# Patient Record
Sex: FEMALE | Race: WHITE | NOT HISPANIC OR LATINO | URBAN - METROPOLITAN AREA
[De-identification: names, ages, dates, MRNs, and addresses within clinical notes are randomized per-mention and may not be internally consistent; named-entity substitution may affect disease eponyms.]

---

## 2021-04-15 DIAGNOSIS — Z23 ENCOUNTER FOR IMMUNIZATION: ICD-10-CM

## 2022-10-18 LAB
EXTERNAL HIV SCREEN: NORMAL
HBA1C MFR BLD HPLC: 5.1 %
HCV AB SER-ACNC: <0.1

## 2022-12-07 ENCOUNTER — ROUTINE PRENATAL (OUTPATIENT)
Dept: PERINATAL CARE | Facility: OTHER | Age: 27
End: 2022-12-07

## 2022-12-07 VITALS
DIASTOLIC BLOOD PRESSURE: 72 MMHG | HEART RATE: 100 BPM | HEIGHT: 63 IN | BODY MASS INDEX: 39.83 KG/M2 | SYSTOLIC BLOOD PRESSURE: 128 MMHG | WEIGHT: 224.8 LBS

## 2022-12-07 DIAGNOSIS — Z3A.20 20 WEEKS GESTATION OF PREGNANCY: ICD-10-CM

## 2022-12-07 DIAGNOSIS — O09.899 SUPERVISION OF OTHER HIGH RISK PREGNANCIES, UNSPECIFIED TRIMESTER: ICD-10-CM

## 2022-12-07 DIAGNOSIS — Z36.86 ENCOUNTER FOR ANTENATAL SCREENING FOR CERVICAL LENGTH: ICD-10-CM

## 2022-12-07 DIAGNOSIS — O99.210 OBESITY AFFECTING PREGNANCY, ANTEPARTUM: Primary | ICD-10-CM

## 2022-12-07 RX ORDER — PROPRANOLOL HYDROCHLORIDE 10 MG/1
TABLET ORAL
COMMUNITY

## 2022-12-07 NOTE — LETTER
2022     Chiki Cedeno, [address] [facility]  [address]    Patient: Shannon Mesa   YOB: 1995   Date of Visit: 2022       Dear Dr Dominga Villanueva: Thank you for referring Shannon Mesa to me for evaluation  Below are my notes for this consultation  If you have questions, please do not hesitate to call me  I look forward to following your patient along with you  Sincerely,        Claudia Maynard MD        CC: No Recipients  Claudia Maynard MD  2022  3:20 PM  Sign when Signing Visit  Nidhi Corrales presents today for a maternal-fetal medicine evaluation and fetal anatomic evaluation  This is the patient's second pregnancy  She has an extremely short interval pregnancy of approximately 3 months  She underwent  section on May 9 of this year at 41 weeks gestation after a 72-hour labor  Indication for  section was fetal indication  Her son weighed 7 pounds 14 ounces  She has an elevated BMI and a surgical history of a herniorrhaphy  She has an allergy to Keflex  Substance use history and family medical history are otherwise unremarkable  Review of systems is otherwise negative  On exam today the patient appears well, in no acute distress, and denies any complaints  Today's ultrasound is limited by fetal position; therefore, the fetal anatomic survey could not be completed  No significant fetal abnormalities are appreciated or suspected  Good fetal movement and tone are seen  The amniotic fluid volume appears normal   A transvaginal ultrasound was performed to assess the cervix, which was cannot be acurately measuraed transabdominally  The cervical length was 4 41 centimeters, which is normal for the current gestational age and above the threshold in which intervention is generally recommended  There was no significant funneling or dynamic changes appreciated  She was informed of today's findings and all of her questions were answered  Jose Luis Kebede had questions regarding opportunity for trial of labor after  section  She has an extremely short interpregnancy interval of approximately 3 months and has an extremely short interdelivery interval of under 1 year  We reviewed that an intra delivery interval of less than 18 months carries a relative risk of approximately 3 times for uterine rupture  We discussed that a short interval delivery interval is not an absolute contraindication to a trial of labor after  section but discussion of these increased risks is warranted  The patient has a short interpregnancy interval of less than 4 months which is associated with increased risk for adverse pregnancy outcomes including but not limited to  labor and fetal growth restriction  Recommend micronutrient supplementation utilizing prenatal vitamins and a follow-up 3rd trimester growth ultrasound  The implications of obesity and pregnancy are significant  The level of obesity is directly related to the risk of adverse pregnancy outcomes including but not limited to, risk of diabetes, hypertensive disorders of pregnancy, macrosomia, intrauterine growth restriction, labor and shoulder dystocias,  section, and increased risk of stillbirth  Recommend discussing the current weight gain recommendations for women with obesity and discussing good dietary practices as well as the safety of exercise in pregnancy  I recommend the patient gain no more than 11-20 pounds throughout her entire pregnancy, increase her exercise and follow healthy dietary habits  Consider referral to a dietitian should the patient have difficulty following the aforementioned recommendations  Recommend third trimester growth ultrasounds to screen for fetal growth problems as well as ensuring the patient is appropriately screened for pregestational and gestational diabetes    Additional  surveillance is recommended starting at 36 weeks in those women with a BMI of greater than 40 given the increased risk for stillbirth  We discussed follow-up in detail and I recommend she return in 8 weeks to our Center in order to complete the fetal anatomic survey and assess fetal growth in the setting of short interval pregnancy  Thank you very much for allowing us to participate in the care of this very nice patient  Should you have any questions, please do not hesitate to contact me

## 2022-12-07 NOTE — PROGRESS NOTES
Ultrasound Probe Disinfection    A transvaginal ultrasound was performed  Prior to use, disinfection was performed with High Level Disinfection Process (Trophon)  Probe serial number [id number]: [id number] was used        Amalia Rowan  12/07/22  1:32 PM

## 2022-12-07 NOTE — PROGRESS NOTES
Ainsley Saravia presents today for a maternal-fetal medicine evaluation and fetal anatomic evaluation  This is the patient's second pregnancy  She has an extremely short interval pregnancy of approximately 3 months  She underwent  section on May 9 of this year at 41 weeks gestation after a 72-hour labor  Indication for  section was fetal indication  Her son weighed 7 pounds 14 ounces  She has an elevated BMI and a surgical history of a herniorrhaphy  She has an allergy to Keflex  Substance use history and family medical history are otherwise unremarkable  Review of systems is otherwise negative  On exam today the patient appears well, in no acute distress, and denies any complaints  Today's ultrasound is limited by fetal position; therefore, the fetal anatomic survey could not be completed  No significant fetal abnormalities are appreciated or suspected  Good fetal movement and tone are seen  The amniotic fluid volume appears normal   A transvaginal ultrasound was performed to assess the cervix, which was cannot be acurately measuraed transabdominally  The cervical length was 4 41 centimeters, which is normal for the current gestational age and above the threshold in which intervention is generally recommended  There was no significant funneling or dynamic changes appreciated  She was informed of today's findings and all of her questions were answered  Ainsley Saravia had questions regarding opportunity for trial of labor after  section  She has an extremely short interpregnancy interval of approximately 3 months and has an extremely short interdelivery interval of under 1 year  We reviewed that an intra delivery interval of less than 18 months carries a relative risk of approximately 3 times for uterine rupture    We discussed that a short interval delivery interval is not an absolute contraindication to a trial of labor after  section but discussion of these increased risks is warranted  The patient has a short interpregnancy interval of less than 4 months which is associated with increased risk for adverse pregnancy outcomes including but not limited to  labor and fetal growth restriction  Recommend micronutrient supplementation utilizing prenatal vitamins and a follow-up 3rd trimester growth ultrasound  The implications of obesity and pregnancy are significant  The level of obesity is directly related to the risk of adverse pregnancy outcomes including but not limited to, risk of diabetes, hypertensive disorders of pregnancy, macrosomia, intrauterine growth restriction, labor and shoulder dystocias,  section, and increased risk of stillbirth  Recommend discussing the current weight gain recommendations for women with obesity and discussing good dietary practices as well as the safety of exercise in pregnancy  I recommend the patient gain no more than 11-20 pounds throughout her entire pregnancy, increase her exercise and follow healthy dietary habits  Consider referral to a dietitian should the patient have difficulty following the aforementioned recommendations  Recommend third trimester growth ultrasounds to screen for fetal growth problems as well as ensuring the patient is appropriately screened for pregestational and gestational diabetes  Additional  surveillance is recommended starting at 36 weeks in those women with a BMI of greater than 40 given the increased risk for stillbirth  We discussed follow-up in detail and I recommend she return in 8 weeks to our Center in order to complete the fetal anatomic survey and assess fetal growth in the setting of short interval pregnancy  Thank you very much for allowing us to participate in the care of this very nice patient  Should you have any questions, please do not hesitate to contact me

## 2023-01-30 ENCOUNTER — TELEPHONE (OUTPATIENT)
Dept: PERINATAL CARE | Facility: OTHER | Age: 28
End: 2023-01-30

## 2023-01-30 ENCOUNTER — ULTRASOUND (OUTPATIENT)
Dept: PERINATAL CARE | Facility: OTHER | Age: 28
End: 2023-01-30

## 2023-01-30 VITALS
WEIGHT: 237.8 LBS | HEIGHT: 63 IN | DIASTOLIC BLOOD PRESSURE: 68 MMHG | SYSTOLIC BLOOD PRESSURE: 118 MMHG | BODY MASS INDEX: 42.13 KG/M2 | HEART RATE: 107 BPM

## 2023-01-30 DIAGNOSIS — Z3A.28 28 WEEKS GESTATION OF PREGNANCY: ICD-10-CM

## 2023-01-30 DIAGNOSIS — O99.210 OBESITY AFFECTING PREGNANCY, ANTEPARTUM: Primary | ICD-10-CM

## 2023-01-30 NOTE — TELEPHONE ENCOUNTER
UNABLE TO REACH PT BY PHONE, VOICEMAIL IS FULL  SENT PT MESSAGE IN [employer]:    Sent message in [facility] to patient to schedule follow up appointment:     Return in about 6 weeks  (around 3/13/2023) for Level 1 ultrasound (growth)  Left voicemail request patient to call back to schedule at [phone]

## 2023-02-01 PROBLEM — Z3A.28 28 WEEKS GESTATION OF PREGNANCY: Status: ACTIVE | Noted: 2022-12-07

## 2023-02-01 NOTE — PROGRESS NOTES
The patient was seen today for an ultrasound  Please see ultrasound report (located under Ob Procedures) for additional details  Thank you very much for allowing us to participate in the care of this very nice patient  Should you have any questions, please do not hesitate to contact me  Alex Brito MD [address] [place]  Attending Physician, Aashish

## 2023-03-19 PROBLEM — O09.899 SHORT INTERVAL BETWEEN PREGNANCIES AFFECTING PREGNANCY, ANTEPARTUM: Status: ACTIVE | Noted: 2023-03-19

## 2023-03-19 PROBLEM — O34.219 HISTORY OF CESAREAN DELIVERY, ANTEPARTUM: Status: ACTIVE | Noted: 2023-03-19

## 2023-03-19 NOTE — PATIENT INSTRUCTIONS
Thank you for choosing us for your  care today  If you have any questions about your ultrasound or care, please do not hesitate to contact us or your primary obstetrician  Some general instructions for your pregnancy are:    Exercise: Aim for 22 minutes per day (150 minutes per week) of regular exercise  Walking is great! Nutrition: Choose healthy sources of calcium, iron, and protein  Learn about Preeclampsia: preeclampsia is a common, serious high blood pressure complication in pregnancy  A blood pressure of 721UKUQ (systolic or top number) or 59LELR (diastolic or bottom number) is not normal and needs evaluation by your doctor  Aspirin is sometimes prescribed in early pregnancy to prevent preeclampsia in women with risk factors - ask your obstetrician if you should be on this medication  For more resources, visit:  [employer] fi  If you smoke, try to reduce how many cigarettes you smoke or try to quit completely  Do not vape  Other warning signs to watch out for in pregnancy or postpartum: chest pain, obstructed breathing or shortness of breath, seizures, thoughts of hurting yourself or your baby, bleeding, a painful or swollen leg, fever, or headache (see [facility] POST-BIRTH Warning Signs campaign)  If these happen call 911  Itching is also not normal in pregnancy and if you experience this, especially over your hands and feet, potentially worse at night, notify your doctors

## 2023-03-20 ENCOUNTER — ULTRASOUND (OUTPATIENT)
Dept: PERINATAL CARE | Facility: OTHER | Age: 28
End: 2023-03-20

## 2023-03-20 VITALS
DIASTOLIC BLOOD PRESSURE: 68 MMHG | HEART RATE: 94 BPM | SYSTOLIC BLOOD PRESSURE: 112 MMHG | HEIGHT: 63 IN | WEIGHT: 246.8 LBS | BODY MASS INDEX: 43.73 KG/M2

## 2023-03-20 DIAGNOSIS — O99.210 OBESITY AFFECTING PREGNANCY, ANTEPARTUM: ICD-10-CM

## 2023-03-20 DIAGNOSIS — O09.899 SHORT INTERVAL BETWEEN PREGNANCIES AFFECTING PREGNANCY, ANTEPARTUM: Primary | ICD-10-CM

## 2023-03-20 DIAGNOSIS — Z36.89 ENCOUNTER FOR ULTRASOUND TO CHECK FETAL GROWTH: ICD-10-CM

## 2023-03-20 DIAGNOSIS — O34.219 HISTORY OF CESAREAN DELIVERY, ANTEPARTUM: ICD-10-CM

## 2023-03-20 NOTE — LETTER
3/20/2023    Nikita Cobian DO  [address] [place] [address]   [address]    Patient: Gely Jain   YOB: 1995   Date of Visit: 3/20/2023   Bisi Goetz of this communication: Routine       Dear Lisa Ortiz,    This patient was seen recently in our  office  Consultation is contained in body of ultrasound report which has been faxed to you under separate cover; please contact us if you do not receive this  Please don't hesitate to contact our office with any concerns or questions       Sincerely,      Tc Leal MD  Attending Physician, Aashish

## 2023-03-20 NOTE — PROGRESS NOTES
[id number] [facility]: Ms Michael Hand was seen today for fetal growth assessment ultrasound  See ultrasound report under "OB Procedures" tab  Please don't hesitate to contact our office with any concerns or questions    Jenna Aceves MD

## 2025-04-07 ENCOUNTER — HOSPITAL ENCOUNTER (OUTPATIENT)
Dept: HOSPITAL 99 - RAD | Age: 30
End: 2025-04-07
Payer: COMMERCIAL

## 2025-04-07 DIAGNOSIS — N39.0: Primary | ICD-10-CM

## 2025-04-07 DIAGNOSIS — R31.29: ICD-10-CM
